# Patient Record
Sex: FEMALE | Race: WHITE | NOT HISPANIC OR LATINO | Employment: FULL TIME | URBAN - METROPOLITAN AREA
[De-identification: names, ages, dates, MRNs, and addresses within clinical notes are randomized per-mention and may not be internally consistent; named-entity substitution may affect disease eponyms.]

---

## 2024-06-06 ENCOUNTER — OFFICE VISIT (OUTPATIENT)
Dept: BARIATRICS/WEIGHT MGMT | Facility: CLINIC | Age: 34
End: 2024-06-06
Payer: COMMERCIAL

## 2024-06-06 VITALS
SYSTOLIC BLOOD PRESSURE: 115 MMHG | BODY MASS INDEX: 28.61 KG/M2 | WEIGHT: 178 LBS | HEART RATE: 76 BPM | TEMPERATURE: 97.8 F | DIASTOLIC BLOOD PRESSURE: 71 MMHG | HEIGHT: 66 IN

## 2024-06-06 DIAGNOSIS — Z98.84 S/P LAPAROSCOPIC SLEEVE GASTRECTOMY: ICD-10-CM

## 2024-06-06 DIAGNOSIS — M79.3 PANNICULITIS: Primary | ICD-10-CM

## 2024-06-06 RX ORDER — DESVENLAFAXINE SUCCINATE 50 MG/1
50 TABLET, EXTENDED RELEASE ORAL DAILY
COMMUNITY
Start: 2024-05-20

## 2024-06-06 NOTE — PROGRESS NOTES
"Chief Complaint  Follow-up (2 year follow up sleeve )    Subjective        Niesha SEVILLA presents to [facility] BARIATRIC SURGERY  History of Present Illness  Patient follows up for status post sleeve gastrectomy--doing well, preoperative weight 260 pounds, she weighed 166 at her visit last year and she was 178 today.  Patient does not complain of any abdominal pain, dysphagia or reflux--her main complaint is her excessive skin, it has become lifestyle limiting particular when she is trying to exercise.  Reviewed her diet and exercise, all seems appropriate.  Objective   Vital Signs:  /71   Pulse 76   Temp 97.8 °F (36.6 °C) (Infrared)   Ht 167.6 cm (65.98\")   Wt 80.7 kg (178 lb)   BMI 28.74 kg/m²   Estimated body mass index is 28.74 kg/m² as calculated from the following:    Height as of this encounter: 167.6 cm (65.98\").    Weight as of this encounter: 80.7 kg (178 lb).             Physical Exam   Alert, pleasant  No respiratory distress  Abdomen soft  Result Review :                   Assessment and Plan   Diagnoses and all orders for this visit:    1. Panniculitis (Primary)  -     Ambulatory Referral to Plastic Surgery  -     CBC & Differential; Future  -     Comprehensive Metabolic Panel; Future  -     Ferritin; Future  -     Folate; Future  -     Hemoglobin A1c; Future  -     Iron; Future  -     Magnesium; Future  -     Phosphorus; Future  -     Methylmalonic Acid, Serum; Future  -     Prealbumin; Future  -     PTH, Intact; Future  -     Vitamin B1, Whole Blood; Future  -     Vitamin D,25-Hydroxy; Future    2. S/P laparoscopic sleeve gastrectomy    Will check 1 year labs--we have previously referred the patient to [facility] plastic surgery but they stated they had a waiting list until after 2025.  Will send a new referral to the [facility] to see if they can get her in sooner.       I spent 20 minutes caring for Niesha on this date of service. This time includes time " spent by me in the following activities:preparing for the visit, reviewing tests, obtaining and/or reviewing a separately obtained history, performing a medically appropriate examination and/or evaluation , counseling and educating the patient/family/caregiver, documenting information in the medical record, and independently interpreting results and communicating that information with the patient/family/caregiver  Follow Up   No follow-ups on file.  Patient was given instructions and counseling regarding her condition or for health maintenance advice. Please see specific information pulled into the AVS if appropriate.

## 2024-11-20 ENCOUNTER — TELEPHONE (OUTPATIENT)
Dept: BARIATRICS/WEIGHT MGMT | Facility: CLINIC | Age: 34
End: 2024-11-20
Payer: COMMERCIAL

## 2024-11-20 DIAGNOSIS — Z98.84 S/P LAPAROSCOPIC SLEEVE GASTRECTOMY: Primary | ICD-10-CM

## 2024-11-20 NOTE — TELEPHONE ENCOUNTER
Monica scheduled patient an appointment with you on 12/16/2024.     In her last office visit it said you would check 1  year labs but I dont see any lab orders?     She is wanting to know if you can order labs and she get them done before her Dec appt with you.    Can you let me know so I can call her please.    Thank you

## 2024-12-18 ENCOUNTER — TELEPHONE (OUTPATIENT)
Dept: BARIATRICS/WEIGHT MGMT | Facility: CLINIC | Age: 34
End: 2024-12-18
Payer: COMMERCIAL

## 2025-02-14 ENCOUNTER — TELEPHONE (OUTPATIENT)
Dept: BARIATRICS/WEIGHT MGMT | Facility: CLINIC | Age: 35
End: 2025-02-14
Payer: COMMERCIAL